# Patient Record
(demographics unavailable — no encounter records)

---

## 2019-02-07 NOTE — PDOC
History of Present Illness





- General


Chief Complaint: Nausea/Vomiting


Stated Complaint: VOMITING,LIGHTHEADED,DIARHHEA


Time Seen by Provider: 02/07/19 07:21


History Source: Patient


Exam Limitations: No Limitations





- History of Present Illness


Travel History: No


Initial Comments: 





02/07/19 08:07


18-year-old male presents to ED with sudden onset of nausea vomiting at 3 AM. 

As per patient he vomited approximately 10 times and since then has felt dizzy 

with no abdominal pain fever or chills. Patient denies recent travel or recent 

sick contacts. Patient also states attends college and left school at around 5 

PM. Patient states ate steak and rice at home with his parents who have no 

symptoms.


Timing/Duration: reports: constant


Quality: reports: mild, cramping


Abdominal Pain Onset Location: reports: epigastric


Pain Radiation: reports: no radiation


Activities at Onset: reports: none


Aggravating Factors: improves with: None


Alleviating Factors: improves with: None





Past History





- Travel


Traveled outside of the country in the last 30 days: No


Close contact w/someone who was outside of country & ill: No





- Past Medical History


Allergies/Adverse Reactions: 


 Allergies











Allergy/AdvReac Type Severity Reaction Status Date / Time


 


lactose AdvReac   Verified 02/07/19 06:25











Home Medications: 


Ambulatory Orders





NK [No Known Home Medication]  04/02/17 











- Immunization History


Immunization Up to Date: Yes





- Suicide/Smoking/Psychosocial Hx


Smoking Status: No


Smoking History: Never smoked


Have you smoked in the past 12 months: No


Number of Cigarettes Smoked Daily: 0


Information on smoking cessation initiated: No


Hx Alcohol Use: No


Drug/Substance Use Hx: No


Substance Use Type: None


Patient Lives Alone: No


Lives with/in: parents





**Review of Systems





- Review of Systems


Able to Perform ROS?: Yes


Constitutional: Yes: Weakness


HEENTM: No: Symptoms Reported


Respiratory: No: Symptoms reported


Cardiac (ROS): No: Symptoms Reported


ABD/GI: Yes: Nausea, Vomiting, Abdominal cramping


: No: Symptoms Reported


Musculoskeletal: No: Symptoms Reported


Integumentary: No: Symptoms Reported


Neurological: No: Symptoms reported


Hematologic/Lymphatic: No: Symptoms Reported





*Physical Exam





- Vital Signs


 Last Vital Signs











Temp Pulse Resp BP Pulse Ox


 


 97.3 F L  103   18   105/72   98 


 


 02/07/19 05:45  02/07/19 05:45  02/07/19 05:45  02/07/19 05:45  02/07/19 05:45














- Physical Exam


General Appearance: Yes: Nourished, Appropriately Dressed.  No: Apparent 

Distress


HEENT: positive: Pharynx Normal.  negative: Pale Conjunctivae


Neck: positive: Supple


Respiratory/Chest: positive: Lungs Clear, Normal Breath Sounds.  negative: 

Respiratory Distress, Accessory Muscle Use


Cardiovascular: positive: Regular Rhythm, Regular Rate.  negative: Murmur


Gastrointestinal/Abdominal: positive: Soft, Tenderness (mild to epigastric 

region with deep palpation)


Musculoskeletal: negative: CVA Tenderness


Extremity: positive: Normal Inspection


Integumentary: positive: Normal Color, Warm, Moist


Neurologic: positive: Normal Mood/Affect, Motor Strength 5/5





Moderate Sedation





- Procedure Monitoring


Vital Signs: 


Procedure Monitoring Vital Signs











Temperature  97.3 F L  02/07/19 05:45


 


Pulse Rate  103   02/07/19 05:45


 


Respiratory Rate  18   02/07/19 05:45


 


Blood Pressure  105/72   02/07/19 05:45


 


O2 Sat by Pulse Oximetry (%)  98   02/07/19 05:45











ED Treatment Course





- LABORATORY


CBC & Chemistry Diagram: 


 02/07/19 06:44





 02/07/19 06:44





- ADDITIONAL ORDERS


Additional order review: 


 Laboratory  Results











  02/07/19





  06:44


 


Sodium  140


 


Potassium  3.8


 


Chloride  109 H


 


Carbon Dioxide  20 L


 


Anion Gap  11


 


BUN  22 H


 


Creatinine  0.9


 


Creat Clearance w eGFR  > 60


 


Random Glucose  94


 


Calcium  9.4


 


Total Bilirubin  1.2 H


 


AST  28


 


ALT  22


 


Alkaline Phosphatase  97


 


Total Protein  7.9


 


Albumin  4.7


 


Lipase  110








 











  02/07/19





  06:44


 


RBC  4.46


 


MCV  97.5 H


 


MCHC  35.2


 


RDW  12.6


 


MPV  9.2


 


Neutrophils %  86.6 H


 


Lymphocytes %  7.2 L D


 


Monocytes %  5.4


 


Eosinophils %  0.6


 


Basophils %  0.2  D














- Medications


Given in the ED: 


ED Medications














Discontinued Medications














Generic Name Dose Route Start Last Admin





  Trade Name Freq  PRN Reason Stop Dose Admin


 


Sodium Chloride  1,000 mls @ 1,000 mls/hr  02/07/19 06:14  02/07/19 06:39





  Normal Saline -  IV  02/07/19 07:13  1,000 mls/hr





  ASDIR STA   Administration





     





     





     





     


 


Ondansetron HCl  4 mg  02/07/19 06:14  02/07/19 06:43





  Zofran Injection  IVPUSH  02/07/19 06:15  4 mg





  ONCE ONE   Administration





     





     





     





     














Medical Decision Making





- Medical Decision Making





02/07/19 08:00


Chief complaint: Nausea vomiting 10 since 2 AM patient states feeling dizzy 

upon arrival. Upon my arrival patient had already received Zofran and IV fluids 

stating he was no longer nauseous but felt tired.


Exam. Patient with mild epigastric tenderness on deep palpation.


Plan: Await labs, second liter of normal saline ordered and hung, will by mouth 

challenge shortly


02/07/19 08:32


 Laboratory Tests











  02/07/19 02/07/19





  06:44 06:44


 


WBC  9.9 


 


Hgb  15.3 


 


Hct  43.5 


 


MCV  97.5 H 


 


MCH  34.3 H 


 


Absolute Neuts (auto)  8.6 H 


 


Neutrophils %  86.6 H 


 


Lymphocytes %  7.2 L D 


 


Sodium   140


 


Potassium   3.8


 


Chloride   109 H


 


Carbon Dioxide   20 L


 


Anion Gap   11


 


Creatinine   0.9


 


Random Glucose   94


 


Calcium   9.4


 


Total Bilirubin   1.2 H


 


AST   28


 


ALT   22


 


Lipase   110








Patient tolerating water. Will discharge patient home with Zofran





*DC/Admit/Observation/Transfer


Diagnosis at time of Disposition: 


 Viral illness








- Discharge Dispostion


Disposition: HOME


Condition at time of disposition: Improved





- Referrals





- Patient Instructions


Printed Discharge Instructions:  DI for Vomiting -- Adult, DI for Nausea -- 

Adult


Additional Instructions: 


Eat bland food for the next 48 hours and advance as tolerated.


Take Zofran as needed for nausea.


Rest.


If symptoms worsen despite above recommendations please return to the emergency 

room





- Post Discharge Activity


Forms/Work/School Notes:  Back to School

## 2020-01-25 NOTE — PDOC
History of Present Illness





- General


History Source: Patient


Exam Limitations: Clinical Condition





- History of Present Illness


Initial Comments: 





01/25/20 14:14


Patient with no significant past medical history present with complaint of 1 

day history of dry cough, nasal congestion, fevers, body aches and abdominal 

pains.  Patient report taking over-the-counter Tylenol Cold and flu for 

symptoms yesterday.  Patient reported nausea but denies vomiting.  Patient has 

not taken any antipyretic for fever.  Denies diarrhea, constipation, recent 

travel or sick contacts.  Patient did not get his flu vaccine.  Denies any 

other symptoms.  Patient feeling very anxious due to body pains and weakness 

with fever.


Is this a multiple visit Asthma Patient?: No


Timing/Duration: 24 hours





<Parvez Kaplan - Last Filed: 01/25/20 15:08>





<Keila Foss - Last Filed: 01/26/20 10:46>





- General


Chief Complaint: Pain


Stated Complaint: ABD PAIN / HEADACHE


Time Seen by Provider: 01/25/20 13:41





Past History





- Past Medical History


COPD: No





- Immunization History


Immunization Up to Date: Yes





- Psycho Social/Smoking Cessation Hx


Smoking Status: No


Smoking History: Never smoked


Have you smoked in the past 12 months: No


Number of Cigarettes Smoked Daily: 0


Hx Alcohol Use: No


Drug/Substance Use Hx: No


Substance Use Type: None





<Parvez Kaplan - Last Filed: 01/25/20 15:08>





<Keila Foss - Last Filed: 01/26/20 10:46>





- Past Medical History


Allergies/Adverse Reactions: 


 Allergies











Allergy/AdvReac Type Severity Reaction Status Date / Time


 


lactose AdvReac   Verified 02/07/19 06:25











Home Medications: 


Ambulatory Orders





Benzonatate [Tessalon Pearls -] 100 mg PO Q8H PRN #21 capsule 01/25/20 


Methylprednisolone [Medrol Dose Puneet] 4 mg PO ASDIR #21 tablet 01/25/20 


Ondansetron [Zofran *Odt*] 4 mg SL Q8H PRN #12 od.tablet 01/25/20 











**Review of Systems





- Review of Systems


Able to Perform ROS?: Yes


Is the patient limited English proficient: No


Constitutional: Yes: Chills, Fever, Malaise


HEENTM: Yes: Symptoms Reported, See HPI, Nose Congestion.  No: Eye Pain, 

Blurred Vision, Tearing, Recent change in vision, Double Vision, Cataracts, Ear 

Pain, Ocular Prothesis, Ear Discharge, Nose Pain, Tinnitus, Nose Bleeding, 

Hearing Loss, Throat Pain, Throat Swelling, Mouth Pain, Dental Problems, 

Difficulty Swallowing, Mouth Swelling, Other


Respiratory: Yes: Symptoms reported, See HPI, Cough.  No: Orthopnea, Shortness 

of Breath, SOB with Exertion, SOB at Rest, Stridor, Wheezing, Productive cough, 

Hemoptysis, Other


Cardiac (ROS): No: Symptoms Reported, See HPI, Chest Pain, Edema, Irregular 

Heart Rate, Lightheadedness, Palpitations, Syncope, Chest Tightness, Other


ABD/GI: Yes: Symptoms Reported, See HPI, Nausea, Abdominal cramping.  No: 

Abdominal Distended, Abd. Pain w/ defecation, Constipated, Diarrhea, Difficulty 

Swallowing, Poor Appetite, Rectal Bleeding, Vomiting, Indigestion


: No: Symptoms Reported, Burning, Dysuria, Frequency, Urgency


Musculoskeletal: No: Symptoms Reported


Integumentary: No: Symptoms Reported, Rash


Neurological: Yes: Symptoms reported, See HPI, Headache, Weakness.  No: Pre-

Existing Deficit, Unsteady Gait, Ataxia, Dizziness


Psychiatric: Yes: Anxiety


All Other Systems: Reviewed and Negative





<Parvez Kaplan - Last Filed: 01/25/20 15:08>





*Physical Exam





- Vital Signs


 Last Vital Signs











Temp Pulse Resp BP Pulse Ox


 


 102.3 F H  132 H  19   118/59 L  97 


 


 01/25/20 13:16  01/25/20 13:16  01/25/20 13:16  01/25/20 13:16  01/25/20 13:16














- Physical Exam





01/25/20 14:17


GENERAL:


Well developed, well nourished. Awake and alert in mild acute distress.


HEENT:  Normocephalic, atraumatic. PERRLA, EOMI. No conjunctival pallor. Sclera 

are non-icteric. Moist mucous membranes. Oropharynx is clear.


NECK: 


Supple. Full ROM. 


CARDIOVASCULAR:


Regular rate and rhythm. No murmurs, rubs, or gallops. Distal pulses are 2+ and 

symmetric. 


PULMONARY: 


No evidence of respiratory distress. Lungs clear to auscultation bilaterally. 

No wheezing, rales or rhonchi.


ABDOMINAL:


Soft.  Mild epigastric tenderness. Non-distended. No rebound or guarding. No 

organomegaly. Normoactive bowel sounds. 


MUSCULOSKELETAL 


Normal range of motion at all joints. 


SKIN: 


Warm and dry. Normal capillary refill. No rashes. No jaundice.  No cyanosis


NEUROLOGICAL: 


Alert, awake, appropriate.  Gait is normal without ataxia.


PSYCHIATRIC: 


Cooperative. Good eye contact. Appropriate mood


General Appearance: Yes: Nourished, Appropriately Dressed, Apparent Distress, 

Mild Distress





<Parvez Kaplan - Last Filed: 01/25/20 15:08>





- Vital Signs


 Last Vital Signs











Temp Pulse Resp BP Pulse Ox


 


 98.8 F   115 H  20   110/70   98 


 


 01/25/20 15:07  01/25/20 15:07  01/25/20 15:07  01/25/20 15:07  01/25/20 15:07














<Keila Foss - Last Filed: 01/26/20 10:46>





ED Treatment Course





- LABORATORY


CBC & Chemistry Diagram: 


 01/25/20 14:10





 01/25/20 14:10





<Parvez Kaplan - Last Filed: 01/25/20 15:08>





- LABORATORY


CBC & Chemistry Diagram: 


 01/25/20 14:10





 01/25/20 14:10





- ADDITIONAL ORDERS


Additional order review: 














 01/25/20 14:32 Throat Culture - Final





 Throat    NO BETA HEMOLYTIC STREPTOCOCCI ISOLATED








 











  01/25/20





  14:10


 


RBC  4.18


 


MCV  98.1 H


 


MCHC  34.3


 


RDW  12.2


 


MPV  8.9


 


Neutrophils %  83.6 H


 


Lymphocytes %  8.3


 


Monocytes %  6.6


 


Eosinophils %  1.2  D


 


Basophils %  0.3














- Medications


Given in the ED: 


ED Medications














Discontinued Medications














Generic Name Dose Route Start Last Admin





  Trade Name Freq  PRN Reason Stop Dose Admin


 


Acetaminophen  1,000 mg  01/25/20 13:51  01/25/20 14:10





  Ofirmev Injection -  IVPB  01/25/20 13:52  1,000 mg





  ONCE ONE   Administration





     





     





     





     


 


Guaifenesin/Codeine Phosphate  10 ml  01/25/20 13:56  01/25/20 14:00





  Robitussin Ac -  PO  01/25/20 13:57  10 ml





  ONCE ONE   Administration





     





     





     





     


 


Sodium Chloride  1,000 mls @ 1,000 mls/hr  01/25/20 13:51  01/25/20 14:11





  Normal Saline -  IV  01/25/20 14:50  1,000 mls/hr





  ASDIR STA   Administration





     





     





     





     














<Keila Foss - Last Filed: 01/26/20 10:46>





Medical Decision Making





- Medical Decision Making





01/25/20 14:15


Patient with no significant past medical history present with complaint of 1 

day history of dry cough, nasal congestion, fevers, body aches and abdominal 

pains.  Patient report taking over-the-counter Tylenol Cold and flu for 

symptoms yesterday.  Patient reported nausea but denies vomiting.  Patient has 

not taken any antipyretic for fever.  Denies diarrhea, constipation, recent 

travel or sick contacts.  Patient did not get his flu vaccine.  Denies any 

other symptoms.  Patient feeling very anxious due to body pains and weakness 

with fever.


Exam is significant for fever 102.2 F otherwise normal exam.  Lungs clear to 

auscultation bilateral.  Normal cardio exam.  Symptoms likely viral syndrome 

versus strep.  CBC, CMP and lipase lab ordered to rule out acute bacterial 

infection given abdominal pain.  Influenza and rapid strep test ordered.  

Tylenol IV 1 g ordered for fever and normal saline 1 L ordered for hydration.  

Robitussin 10 mL p.o. ordered for cough.  Reassess after labs


01/25/20 15:08


CBC and chemistry lab wnl. flu and strep negative. Patient report improvement 

in symptoms with meds and fluids. Patient symptoms likely viral URI and viral 

syndrome and stable for outpatient management on Tessalon perles and medrol-

pack for URI with advise to increase fluid intake and antipyrectic with PCP f/u





<Parvez Kaplan - Last Filed: 01/25/20 15:08>





- Medical Decision Making





I reviewed the case with the mid-level practitioner and agree with the mid-

level practitioner's assessment, diagnosis and disposition.








<Keila Foss - Last Filed: 01/26/20 10:46>





Discharge





- Discharge Information


Problems reviewed: Yes





- Admission


No





<Parvez Kaplan - Last Filed: 01/25/20 15:08>





<Keila Foss - Last Filed: 01/26/20 10:46>





- Discharge Information


Clinical Impression/Diagnosis: 


 Viral syndrome





URI (upper respiratory infection)


Qualifiers:


 URI type: unspecified viral URI Qualified Code(s): J06.9 - Acute upper 

respiratory infection, unspecified





Condition: Improved


Disposition: HOME





- Additional Discharge Information


Prescriptions: 


Benzonatate [Tessalon Pearls -] 100 mg PO Q8H PRN #21 capsule


 PRN Reason: Cough


Methylprednisolone [Medrol Dose Puneet] 4 mg PO ASDIR #21 tablet


Ondansetron [Zofran *Odt*] 4 mg SL Q8H PRN #12 od.tablet


 PRN Reason: nausea





- Follow up/Referral


Referrals: 


ON STAFF,NOT [Primary Care Provider] - 





- Patient Discharge Instructions


Patient Printed Discharge Instructions:  DI for Viral Syndrome


Additional Instructions: 


Your labs are normal. Your strep and flu are negative. Your symptoms is likely 

from viral infection. Take prescribed medications as prescribed for symptoms. 

Alternative between motrin and Tylenol as needed for fever. Increase fluid 

intake. Follow-up with PCP





- Post Discharge Activity


Work/Back to School Note:  Back to School